# Patient Record
Sex: FEMALE | Race: WHITE | Employment: OTHER | URBAN - METROPOLITAN AREA
[De-identification: names, ages, dates, MRNs, and addresses within clinical notes are randomized per-mention and may not be internally consistent; named-entity substitution may affect disease eponyms.]

---

## 2024-03-24 ENCOUNTER — APPOINTMENT (OUTPATIENT)
Dept: GENERAL RADIOLOGY | Age: 49
End: 2024-03-24

## 2024-03-24 ENCOUNTER — APPOINTMENT (OUTPATIENT)
Dept: CT IMAGING | Age: 49
End: 2024-03-24

## 2024-03-24 ENCOUNTER — HOSPITAL ENCOUNTER (EMERGENCY)
Age: 49
Discharge: HOME | End: 2024-03-25
Attending: EMERGENCY MEDICINE | Admitting: FAMILY MEDICINE

## 2024-03-24 VITALS
HEART RATE: 91 BPM | TEMPERATURE: 98 F | OXYGEN SATURATION: 95 % | RESPIRATION RATE: 16 BRPM | SYSTOLIC BLOOD PRESSURE: 128 MMHG | DIASTOLIC BLOOD PRESSURE: 88 MMHG
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
## 2024-03-24 PROCEDURE — 73070 X-RAY EXAM OF ELBOW: CPT

## 2024-03-24 PROCEDURE — 96372 THER/PROPH/DIAG INJ SC/IM: CPT

## 2024-03-24 PROCEDURE — 73090 X-RAY EXAM OF FOREARM: CPT

## 2024-03-24 PROCEDURE — 73110 X-RAY EXAM OF WRIST: CPT

## 2024-03-24 PROCEDURE — 72125 CT NECK SPINE W/O DYE: CPT

## 2024-03-24 PROCEDURE — 70450 CT HEAD/BRAIN W/O DYE: CPT

## 2024-03-24 PROCEDURE — 72170 X-RAY EXAM OF PELVIS: CPT

## 2024-03-24 PROCEDURE — 99284 EMERGENCY DEPT VISIT MOD MDM: CPT

## 2024-03-24 PROCEDURE — 71045 X-RAY EXAM CHEST 1 VIEW: CPT

## 2024-03-24 PROCEDURE — 73562 X-RAY EXAM OF KNEE 3: CPT

## 2024-03-24 PROCEDURE — 6360000002 HC RX W HCPCS: Performed by: EMERGENCY MEDICINE

## 2024-03-24 RX ORDER — HYDROCODONE BITARTRATE AND ACETAMINOPHEN 5; 325 MG/1; MG/1
1 TABLET ORAL ONCE
Status: DISCONTINUED | OUTPATIENT
Start: 2024-03-24 | End: 2024-03-24

## 2024-03-24 RX ORDER — FENTANYL CITRATE 50 UG/ML
50 INJECTION, SOLUTION INTRAMUSCULAR; INTRAVENOUS ONCE
Status: COMPLETED | OUTPATIENT
Start: 2024-03-24 | End: 2024-03-24

## 2024-03-24 RX ADMIN — FENTANYL CITRATE 50 MCG: 50 INJECTION INTRAMUSCULAR; INTRAVENOUS at 18:03

## 2024-03-24 ASSESSMENT — PAIN DESCRIPTION - ORIENTATION: ORIENTATION: LOWER

## 2024-03-24 ASSESSMENT — LIFESTYLE VARIABLES: HOW MANY STANDARD DRINKS CONTAINING ALCOHOL DO YOU HAVE ON A TYPICAL DAY: PATIENT DOES NOT DRINK

## 2024-03-24 ASSESSMENT — PAIN DESCRIPTION - DESCRIPTORS: DESCRIPTORS: SHARP;STABBING

## 2024-03-24 ASSESSMENT — PAIN SCALES - GENERAL: PAINLEVEL_OUTOF10: 8

## 2024-03-24 ASSESSMENT — PAIN DESCRIPTION - LOCATION: LOCATION: BACK

## 2024-03-24 NOTE — ED PROVIDER NOTES
[facility] EMERGENCY DEPARTMENT  EMERGENCY DEPARTMENT ENCOUNTER        Pt Name: Jason Rangel  MRN: [id number]  Birthdate 1975  Date of evaluation: 3/24/2024  Provider: Kim Zaldivar DO  PCP: No, Pcp  Note Started: 4:04 PM EDT 3/24/24    CHIEF COMPLAINT       Chief Complaint   Patient presents with    Fall     Fall at [facility], left wrist fx confirmed by facility, KHADAR parmar&Ox2 @ baseline per facility. Left arm contracted d/t hx stroke left sided deficits     Limitations to history : None    Jason Rangel is a 48 y.o. female who presents with concern for tailbone, left knee, wrist pain after a fall.  Patient says she did have a stroke in the past, she does ABG is primarily tube feed dependent, she was walking this morning when she slipped and fell.  Says she does not know what happened, does not know she had head, does not know if she lost consciousness, she is not exactly sure what she hurt whenever she fell earlier. No chest pain or difficulty breathing, no abdominal pain, not been sick at all recently, no headaches vision changes, neck, thoracic or lumbar spine jose juan, mild left knee pain, no other associations.  Not on anticoagulation.      REVIEW OF SYSTEMS :      Positives and Pertinent negatives as per HPI.     SURGICAL HISTORY   No past surgical history on file.    CURRENTMEDICATIONS       There are no discharge medications for this patient.      ALLERGIES     Patient has no known allergies.    FAMILYHISTORY     No family history on file.     SOCIAL HISTORY          SCREENINGS        Martita Coma Scale  Eye Opening: Spontaneous  Best Verbal Response: Oriented  Best Motor Response: Obeys commands  Martita Coma Scale Score: 15                CIWA Assessment  BP: 128/88  Pulse: 91           PHYSICAL EXAM  1 or more Elements     ED Triage Vitals [03/24/24 1546]   BP Temp Temp src Pulse Respirations SpO2 Height Weight   (!) 144/106 98 °F (36.7 °C) -- (!) 117 18 97 % -- --

## 2024-03-24 NOTE — ED NOTES
For all information, family asks to have us call them.     Lelohilary Jamesgabriel (Daughter): [phone]    Parag Wilma (Son-In-Law): [phone]

## 2024-03-25 NOTE — ED NOTES
Nurse to nurse report called to [facility] and got voicemail. Left voicemail with name and number for [facility] to call back for report. If this RN does not hear from [facility], will call again